# Patient Record
Sex: MALE | Race: WHITE | NOT HISPANIC OR LATINO | Employment: OTHER
[De-identification: names, ages, dates, MRNs, and addresses within clinical notes are randomized per-mention and may not be internally consistent; named-entity substitution may affect disease eponyms.]

---

## 2018-10-29 ENCOUNTER — RECORDS - HEALTHEAST (OUTPATIENT)
Dept: ADMINISTRATIVE | Facility: OTHER | Age: 63
End: 2018-10-29

## 2021-05-28 ENCOUNTER — RECORDS - HEALTHEAST (OUTPATIENT)
Dept: ADMINISTRATIVE | Facility: CLINIC | Age: 66
End: 2021-05-28

## 2021-06-01 ENCOUNTER — RECORDS - HEALTHEAST (OUTPATIENT)
Dept: ADMINISTRATIVE | Facility: CLINIC | Age: 66
End: 2021-06-01

## 2021-06-02 VITALS — WEIGHT: 180 LBS

## 2022-01-19 ENCOUNTER — APPOINTMENT (OUTPATIENT)
Dept: CT IMAGING | Facility: HOSPITAL | Age: 67
End: 2022-01-19
Attending: EMERGENCY MEDICINE
Payer: COMMERCIAL

## 2022-01-19 LAB
ALBUMIN SERPL-MCNC: 4.2 G/DL (ref 3.5–5)
ALBUMIN UR-MCNC: 10 MG/DL
ALP SERPL-CCNC: 89 U/L (ref 45–120)
ALT SERPL W P-5'-P-CCNC: 24 U/L (ref 0–45)
ANION GAP SERPL CALCULATED.3IONS-SCNC: 11 MMOL/L (ref 5–18)
APPEARANCE UR: CLEAR
AST SERPL W P-5'-P-CCNC: 22 U/L (ref 0–40)
BASOPHILS # BLD AUTO: 0 10E3/UL (ref 0–0.2)
BASOPHILS NFR BLD AUTO: 0 %
BILIRUB SERPL-MCNC: 0.3 MG/DL (ref 0–1)
BILIRUB UR QL STRIP: NEGATIVE
BUN SERPL-MCNC: 18 MG/DL (ref 8–22)
C REACTIVE PROTEIN LHE: 0.3 MG/DL (ref 0–0.8)
CALCIUM SERPL-MCNC: 9.6 MG/DL (ref 8.5–10.5)
CHLORIDE BLD-SCNC: 107 MMOL/L (ref 98–107)
CO2 SERPL-SCNC: 23 MMOL/L (ref 22–31)
COLOR UR AUTO: ABNORMAL
CREAT SERPL-MCNC: 1.43 MG/DL (ref 0.7–1.3)
EOSINOPHIL # BLD AUTO: 0.1 10E3/UL (ref 0–0.7)
EOSINOPHIL NFR BLD AUTO: 1 %
ERYTHROCYTE [DISTWIDTH] IN BLOOD BY AUTOMATED COUNT: 13.7 % (ref 10–15)
GFR SERPL CREATININE-BSD FRML MDRD: 54 ML/MIN/1.73M2
GLUCOSE BLD-MCNC: 138 MG/DL (ref 70–125)
GLUCOSE UR STRIP-MCNC: NEGATIVE MG/DL
HCT VFR BLD AUTO: 44.7 % (ref 40–53)
HGB BLD-MCNC: 14.8 G/DL (ref 13.3–17.7)
HGB UR QL STRIP: NEGATIVE
IMM GRANULOCYTES # BLD: 0 10E3/UL
IMM GRANULOCYTES NFR BLD: 0 %
KETONES UR STRIP-MCNC: NEGATIVE MG/DL
LEUKOCYTE ESTERASE UR QL STRIP: ABNORMAL
LYMPHOCYTES # BLD AUTO: 1.7 10E3/UL (ref 0.8–5.3)
LYMPHOCYTES NFR BLD AUTO: 15 %
MCH RBC QN AUTO: 29.1 PG (ref 26.5–33)
MCHC RBC AUTO-ENTMCNC: 33.1 G/DL (ref 31.5–36.5)
MCV RBC AUTO: 88 FL (ref 78–100)
MONOCYTES # BLD AUTO: 1 10E3/UL (ref 0–1.3)
MONOCYTES NFR BLD AUTO: 9 %
MUCOUS THREADS #/AREA URNS LPF: PRESENT /LPF
NEUTROPHILS # BLD AUTO: 8.4 10E3/UL (ref 1.6–8.3)
NEUTROPHILS NFR BLD AUTO: 75 %
NITRATE UR QL: NEGATIVE
NRBC # BLD AUTO: 0 10E3/UL
NRBC BLD AUTO-RTO: 0 /100
PH UR STRIP: 5.5 [PH] (ref 5–7)
PLATELET # BLD AUTO: 271 10E3/UL (ref 150–450)
POTASSIUM BLD-SCNC: 4.6 MMOL/L (ref 3.5–5)
PROT SERPL-MCNC: 7.6 G/DL (ref 6–8)
RBC # BLD AUTO: 5.09 10E6/UL (ref 4.4–5.9)
RBC URINE: 7 /HPF
SODIUM SERPL-SCNC: 141 MMOL/L (ref 136–145)
SP GR UR STRIP: 1.03 (ref 1–1.03)
UROBILINOGEN UR STRIP-MCNC: <2 MG/DL
WBC # BLD AUTO: 11.2 10E3/UL (ref 4–11)
WBC URINE: 8 /HPF

## 2022-01-19 PROCEDURE — 36415 COLL VENOUS BLD VENIPUNCTURE: CPT | Performed by: STUDENT IN AN ORGANIZED HEALTH CARE EDUCATION/TRAINING PROGRAM

## 2022-01-19 PROCEDURE — 250N000013 HC RX MED GY IP 250 OP 250 PS 637: Performed by: STUDENT IN AN ORGANIZED HEALTH CARE EDUCATION/TRAINING PROGRAM

## 2022-01-19 PROCEDURE — 258N000003 HC RX IP 258 OP 636: Performed by: EMERGENCY MEDICINE

## 2022-01-19 PROCEDURE — 80053 COMPREHEN METABOLIC PANEL: CPT | Performed by: EMERGENCY MEDICINE

## 2022-01-19 PROCEDURE — 250N000011 HC RX IP 250 OP 636: Performed by: STUDENT IN AN ORGANIZED HEALTH CARE EDUCATION/TRAINING PROGRAM

## 2022-01-19 PROCEDURE — 86140 C-REACTIVE PROTEIN: CPT | Performed by: EMERGENCY MEDICINE

## 2022-01-19 PROCEDURE — 81001 URINALYSIS AUTO W/SCOPE: CPT | Performed by: STUDENT IN AN ORGANIZED HEALTH CARE EDUCATION/TRAINING PROGRAM

## 2022-01-19 PROCEDURE — 250N000011 HC RX IP 250 OP 636

## 2022-01-19 PROCEDURE — C9803 HOPD COVID-19 SPEC COLLECT: HCPCS

## 2022-01-19 PROCEDURE — 74176 CT ABD & PELVIS W/O CONTRAST: CPT

## 2022-01-19 PROCEDURE — 99285 EMERGENCY DEPT VISIT HI MDM: CPT | Mod: 25

## 2022-01-19 PROCEDURE — 85025 COMPLETE CBC W/AUTO DIFF WBC: CPT | Performed by: EMERGENCY MEDICINE

## 2022-01-19 PROCEDURE — 96374 THER/PROPH/DIAG INJ IV PUSH: CPT

## 2022-01-19 PROCEDURE — 96361 HYDRATE IV INFUSION ADD-ON: CPT

## 2022-01-19 PROCEDURE — 81001 URINALYSIS AUTO W/SCOPE: CPT | Performed by: EMERGENCY MEDICINE

## 2022-01-19 RX ORDER — ACETAMINOPHEN 325 MG/1
975 TABLET ORAL ONCE
Status: COMPLETED | OUTPATIENT
Start: 2022-01-19 | End: 2022-01-19

## 2022-01-19 RX ORDER — KETOROLAC TROMETHAMINE 30 MG/ML
15 INJECTION, SOLUTION INTRAMUSCULAR; INTRAVENOUS ONCE
Status: COMPLETED | OUTPATIENT
Start: 2022-01-20 | End: 2022-01-19

## 2022-01-19 RX ORDER — ONDANSETRON 2 MG/ML
4 INJECTION INTRAMUSCULAR; INTRAVENOUS EVERY 30 MIN PRN
Status: DISCONTINUED | OUTPATIENT
Start: 2022-01-19 | End: 2022-01-20 | Stop reason: HOSPADM

## 2022-01-19 RX ORDER — KETOROLAC TROMETHAMINE 30 MG/ML
INJECTION, SOLUTION INTRAMUSCULAR; INTRAVENOUS
Status: COMPLETED
Start: 2022-01-19 | End: 2022-01-19

## 2022-01-19 RX ORDER — ONDANSETRON 4 MG/1
4 TABLET, ORALLY DISINTEGRATING ORAL ONCE
Status: COMPLETED | OUTPATIENT
Start: 2022-01-19 | End: 2022-01-19

## 2022-01-19 RX ADMIN — ACETAMINOPHEN 975 MG: 325 TABLET ORAL at 22:52

## 2022-01-19 RX ADMIN — KETOROLAC TROMETHAMINE 15 MG: 30 INJECTION, SOLUTION INTRAMUSCULAR; INTRAVENOUS at 23:39

## 2022-01-19 RX ADMIN — SODIUM CHLORIDE 1000 ML: 9 INJECTION, SOLUTION INTRAVENOUS at 23:40

## 2022-01-19 RX ADMIN — ONDANSETRON 4 MG: 4 TABLET, ORALLY DISINTEGRATING ORAL at 23:15

## 2022-01-19 RX ADMIN — Medication 50 MG: at 22:51

## 2022-01-20 ENCOUNTER — ANESTHESIA EVENT (OUTPATIENT)
Dept: SURGERY | Facility: AMBULATORY SURGERY CENTER | Age: 67
End: 2022-01-20
Payer: COMMERCIAL

## 2022-01-20 ENCOUNTER — VIRTUAL VISIT (OUTPATIENT)
Dept: UROLOGY | Facility: CLINIC | Age: 67
End: 2022-01-20
Payer: COMMERCIAL

## 2022-01-20 ENCOUNTER — HOSPITAL ENCOUNTER (EMERGENCY)
Facility: HOSPITAL | Age: 67
Discharge: HOME | End: 2022-01-20
Attending: EMERGENCY MEDICINE | Admitting: EMERGENCY MEDICINE
Payer: COMMERCIAL

## 2022-01-20 VITALS
DIASTOLIC BLOOD PRESSURE: 75 MMHG | OXYGEN SATURATION: 98 % | RESPIRATION RATE: 18 BRPM | TEMPERATURE: 98.3 F | SYSTOLIC BLOOD PRESSURE: 165 MMHG | WEIGHT: 185 LBS | HEART RATE: 54 BPM

## 2022-01-20 DIAGNOSIS — N20.0 CALCULUS OF KIDNEY: ICD-10-CM

## 2022-01-20 DIAGNOSIS — N13.2 HYDRONEPHROSIS WITH URINARY OBSTRUCTION DUE TO URETERAL CALCULUS: ICD-10-CM

## 2022-01-20 DIAGNOSIS — R11.0 NAUSEA: ICD-10-CM

## 2022-01-20 DIAGNOSIS — N20.1 CALCULUS OF URETER: Primary | ICD-10-CM

## 2022-01-20 DIAGNOSIS — N23 URETERAL COLIC: Primary | ICD-10-CM

## 2022-01-20 DIAGNOSIS — R10.9 ACUTE RIGHT FLANK PAIN: ICD-10-CM

## 2022-01-20 LAB
HOLD SPECIMEN: NORMAL
SARS-COV-2 RNA RESP QL NAA+PROBE: NEGATIVE

## 2022-01-20 PROCEDURE — 99203 OFFICE O/P NEW LOW 30 MIN: CPT | Mod: GT | Performed by: PHYSICIAN ASSISTANT

## 2022-01-20 PROCEDURE — 87635 SARS-COV-2 COVID-19 AMP PRB: CPT | Performed by: EMERGENCY MEDICINE

## 2022-01-20 RX ORDER — TAMSULOSIN HYDROCHLORIDE 0.4 MG/1
0.4 CAPSULE ORAL DAILY
Qty: 10 CAPSULE | Refills: 0 | Status: SHIPPED | OUTPATIENT
Start: 2022-01-20 | End: 2022-01-30

## 2022-01-20 RX ORDER — KETOROLAC TROMETHAMINE 30 MG/ML
15 INJECTION, SOLUTION INTRAMUSCULAR; INTRAVENOUS
Status: CANCELLED | OUTPATIENT
Start: 2022-01-20

## 2022-01-20 RX ORDER — MAGNESIUM SULFATE HEPTAHYDRATE 40 MG/ML
4 INJECTION, SOLUTION INTRAVENOUS ONCE
Status: CANCELLED | OUTPATIENT
Start: 2022-01-20 | End: 2022-01-20

## 2022-01-20 RX ORDER — ONDANSETRON 4 MG/1
4 TABLET, ORALLY DISINTEGRATING ORAL EVERY 8 HOURS PRN
Qty: 20 TABLET | Refills: 0 | Status: SHIPPED | OUTPATIENT
Start: 2022-01-20 | End: 2022-02-14

## 2022-01-20 RX ORDER — OXYCODONE HYDROCHLORIDE 5 MG/1
5 TABLET ORAL EVERY 6 HOURS PRN
Qty: 12 TABLET | Refills: 0 | Status: SHIPPED | OUTPATIENT
Start: 2022-01-20 | End: 2022-01-23

## 2022-01-20 RX ORDER — GABAPENTIN 100 MG/1
300 CAPSULE ORAL
Status: CANCELLED | OUTPATIENT
Start: 2022-01-20

## 2022-01-20 RX ORDER — ACETAMINOPHEN 500 MG
1000 TABLET ORAL
Status: CANCELLED | OUTPATIENT
Start: 2022-01-20

## 2022-01-20 ASSESSMENT — ENCOUNTER SYMPTOMS
DYSURIA: 0
HEMATURIA: 0
NAUSEA: 1
ABDOMINAL PAIN: 1
VOMITING: 1
FLANK PAIN: 1

## 2022-01-20 ASSESSMENT — MIFFLIN-ST. JEOR: SCORE: 1601.59

## 2022-01-20 ASSESSMENT — PAIN SCALES - GENERAL: PAINLEVEL: NO PAIN (0)

## 2022-01-20 NOTE — H&P (VIEW-ONLY)
Assessment/Plan:    Assessment & Plan   Tej was seen today for new patient.    Diagnoses and all orders for this visit:    Calculus of ureter  -     Case Request: CYSTOURETEROSCOPY, RETROGRADE PYELOGRAM, LASER LITHOTRIPSY WITH CALCULUS REMOVAL AND URETERAL STENT INSERTION; Standing  -     Case Request: CYSTOURETEROSCOPY, RETROGRADE PYELOGRAM, LASER LITHOTRIPSY WITH CALCULUS REMOVAL AND URETERAL STENT INSERTION  -     Patient Stated Goal: Know what to expect after surgery    Acute right flank pain    Hydronephrosis with urinary obstruction due to ureteral calculus  -     Case Request: CYSTOURETEROSCOPY, RETROGRADE PYELOGRAM, LASER LITHOTRIPSY WITH CALCULUS REMOVAL AND URETERAL STENT INSERTION; Standing  -     Case Request: CYSTOURETEROSCOPY, RETROGRADE PYELOGRAM, LASER LITHOTRIPSY WITH CALCULUS REMOVAL AND URETERAL STENT INSERTION    Nausea    Calculus of kidney    Other orders  -     Adult Urology Referral  -     sterile water (bottle) irrigation  -     XR Surgery JEROME Fluoro L/T 5 Min; Standing  -     XR Surgery JEROME Fluoro L/T 5 Min        Stone Management Plan  Stone Management 1/20/2022   Urinary Tract Infection No suspicion of infection   Renal Colic Well controlled symptoms   Renal Failure No suspicion of renal failure   Current CT date 1/20/2022   Right sided stones? Yes   R Number of ureteral stones 1   R GSD of ureteral stones 9   R Location of ureteral stone Proximal   R Number of kidney stones  7   R GSD of kidney stones 4 - 10   R Hydronephrosis Mild   R Stone Event New event   Diagnosis date 1/20/2022   Initial location of primary symptomatic stone Proximal   Initial GSD of primary symptomatic stone 9   R Current Plan Clear   Clear rationale Symptomatic   Left sided stones? Yes   L Number of ureteral stones No ureteral stones   L Number of kidney stones  4   L GSD of kidney stones 4 - 10   L Hydronephrosis None   L Stone Event No current event   L Current Plan Observe   Observe rationale Significant  stone burden amenable to emergency management         PLAN    65 yo M with history of kidney stones x 2, no prior surgical stone interventions. Recent ER visit for symptomatic, obstructing right proximal ureteral stone, pain currently controlled. Several, bilateral non-obstructing renal stones, right > left.    Will proceed with ureterscopic stone clearance at earliest opportunity; may cause disruption to his upcoming travel plans. Risks and benefits were detailed of ureteroscopic stone clearance including potential issues of urinary or systemic infection, ureteral injury, inaccessible stone, incomplete stone clearance, multiple surgeries, and stent related symptoms of urgency, frequency and hematuria. Patient verbalized understanding. Patient agrees with plan as discussed. Preoperative evaluation with primary care has been requested.    For symptom control, he was prescribed oxycodone, ondansetron and Flomax. Over the counter symptom control medications of ibuprofen, Dramamine and Tylenol were recommended.    Video call duration: 15 minutes  21 minutes spent on the date of the encounter doing chart review, history and exam, documentation and further activities per the note    Raquel Uribe PA-C  [facility]    Subjective:     HPI  Mr. Tej Sanders is a 66 year old  male who is being evaluated via a billable video visit by [facility] following [facility] ER visit for urolithiasis.    He is an unidentified composition stone former who has not required stone clearance procedures. He has not previously participated in stone risk evaluation. He has no identified modifiable stone risk factors. He has no identified non-modifiable stone risk factors.    He was sen in ER yesterday for acute onset right abdominal and right flank pain, starting a few days ago. The pain was initially mild but progressed to a severe and constant pain radiating into the the  right flank. He had associated nausea and vomiting. He reported history of kidney stones. Workup was notable for CT reporting an obstructing right ureteral stone and bilateral renal stones. Labs were negative for infection with mild renal injury. He was sent home with flomax, zofran, and oxycodone.    He is feeling ok at this time. Pertinent negative current symptoms include:  fever, chills, nausea, vomiting, hematuria, urinary frequency and dysuria. He is concerned about his upcoming travel, as he is flying to [place] next Tuesday through Friday.    CT scan from overnight is personally reviewed and demonstrates a mildly obstructing 9 mm right proximal ureteral stone. Multiple, bilateral renal stones.    Significant labs from presentation include no hematuria, mild pyuria, negative nitrite, no bacteria, normal WBC, normal C reactive protein, slightly elevated creatinine and normal potassium; covid negative    ROS   A 12 point comprehensive review of systems is negative except for HPI    Past Medical History:   Diagnosis Date     Kidney stone     x2 spontaneous passage     History reviewed. No pertinent surgical history.    Current Outpatient Medications   Medication Sig Dispense Refill     ondansetron (ZOFRAN ODT) 4 MG ODT tab Take 1 tablet (4 mg) by mouth every 8 hours as needed for nausea 20 tablet 0     oxyCODONE (ROXICODONE) 5 MG tablet Take 1 tablet (5 mg) by mouth every 6 hours as needed for pain 12 tablet 0     tamsulosin (FLOMAX) 0.4 MG capsule Take 1 capsule (0.4 mg) by mouth daily for 10 doses 10 capsule 0       No Known Allergies    Social History     Socioeconomic History     Marital status:      Spouse name: Not on file     Number of children: Not on file     Years of education: Not on file     Highest education level: Not on file   Occupational History     Not on file   Tobacco Use     Smoking status: Never Smoker     Smokeless tobacco: Never Used   Substance and Sexual Activity     Alcohol  use: Not Currently     Drug use: Never     Sexual activity: Not on file   Other Topics Concern     Parent/sibling w/ CABG, MI or angioplasty before 65F 55M? Not Asked   Social History Narrative     Not on file     Social Determinants of Health     Financial Resource Strain: Not on file   Food Insecurity: Not on file   Transportation Needs: Not on file   Physical Activity: Not on file   Stress: Not on file   Social Connections: Not on file   Intimate Partner Violence: Not on file   Housing Stability: Not on file       History reviewed. No pertinent family history.    Objective:     No vitals or physical exam obtained due to virtual visit    LABS  Most Recent 3 CBC's:  Recent Labs   Lab Test 01/19/22  2314   WBC 11.2*   HGB 14.8   MCV 88        Most Recent 3 BMP's:  Recent Labs   Lab Test 01/19/22 2314      POTASSIUM 4.6   CHLORIDE 107   CO2 23   BUN 18   CR 1.43*   ANIONGAP 11   CORI 9.6   *     Most Recent Urinalysis:  Recent Labs   Lab Test 01/19/22  2308   COLOR Light Yellow   APPEARANCE Clear   URINEGLC Negative   URINEBILI Negative   URINEKETONE Negative   SG 1.027   UBLD Negative   URINEPH 5.5   PROTEIN 10 *   NITRITE Negative   LEUKEST 25 Cecilia/uL*   RBCU 7*   WBCU 8*     Most Recent ESR & CRP:  Recent Labs   Lab Test 01/19/22 2314   CRP 0.3

## 2022-01-20 NOTE — DISCHARGE INSTRUCTIONS
For pain control at home, take:  - over-the-counter ibuprofen 800mg by mouth every 8 hours scheduled (max dose 2400mg in 24 hours)  - over-the-counter acetaminophen 1g by mouth every 6 hours scheduled (max dose 4g in 24 hours)  - prescribed oxycodone for breakthrough pain    Use the prescribed Zofran as needed for any nausea.    Take the Flomax as prescribed. This can help with passing the stone on your own.    Call kidney stone clinic in the morning to arrange follow up with them.    Return to the Emergency Department for any severe worsening, persistent vomiting, or any other concerns.

## 2022-01-20 NOTE — PROGRESS NOTES
Patient is roomed via telephone for a virtual visit.  Patient confirmed he is in the [facility] at the time of this appointment.  Patient understands that this virtual visit is billable and agree to proceed with appointment.

## 2022-01-20 NOTE — ED TRIAGE NOTES
Patient has a sudden onset of right mid abdominal pain that radiates to flank. Also endorses nausea. Had a similar episode on Sunday that resolved after four hours. Reports a history of kidney stones. Denies hematuria.

## 2022-01-20 NOTE — PATIENT INSTRUCTIONS
Patient Stated Goal: Know what to expect after surgery  Ureteroscopy    Ureteroscopy is a procedure which is done for clearance of stones from the ureter, kidney or both. There are no incisions involved. The procedure involves your surgeon placing a small scope into your urethra. This is the opening where urine leaves your body.  The surgeon watches as they carefully guide the scope to the stone(s).  Modern flexible ureteroscopes can be used to reach virtually any location within the urinary tract.     The size, shape and location of the stone determines how best to treat the stone(s).  Whenever possible, stones are removed in one piece.  Larger stones need to be broken using a laser before removing in smaller pieces.  The goal is to remove all stones and stone fragments from that side of the body in a single treatment.  Complete stone clearance is an important step to prevent future kidney stone episodes.    Surgery:    Same day outpatient procedure    30-60 minutes    Procedure done in hospital surgical suite    General anesthesia (you will be asleep during the procedure)     Antibiotic prior to surgery to prevent infection    Physician will visit with you and respond to any questions or concerns and consent will be signed prior to going to the operating room    Risks:    Infection - Preoperative antibiotics should prevent new infections but it is possible that unanticipated bacteria may be introduced at time of surgery or that the stones were actually chronically infected before surgery      Injury - The ureter may be injured during this procedure.  This is most likely to happen if the ureter was very inflamed before surgery or if a stone is very tightly impacted.  The surgeon will not aggressively treat a stone if this creates a risk of injury.        Inaccessible Stones -A single procedure is effective in 95% of cases, but if your ureter is very narrow or your kidney stone is very impacted, a stent will be  placed and the procedure stopped.  In 1-2 weeks after the ureter has relaxed, the patient will be brought back to surgery and the procedure can be safely performed.      Incomplete stone clearance -Occasionally stone or stone fragments may not be completely cleared.  These may pass on their own, which may cause discomfort.  Our goal is to remove all possible stones and fragments.    Stent:      An internal soft tube will be placed between the kidney and the bladder while in surgery (after the stone is cleared). The stent will keep the kidney draining.    What should I expect?     It is common for a stent to cause some irritation and discomfort.   You may have:      The need to urinate suddenly     The need to urinate often     Pain during urination     A dull backache, which may get worse during urination     Blood stained urine (like fruit punch) and occasional small clots    It s important to remember the stent is necessary and only temporary. To feel more comfortable:      Drink more than you normally would but you do not have to constantly  flush your kidneys     Limiting your activity may decrease irritation or bleeding    Ibuprofen - 2 tablets every 6-8 hours     Use pain medications as directed.    When is the stent removed?    Most stents are removed within 5 days to 2 weeks after a procedure.     How is the stent removed?     Your stent will be removed in the [facility] with a small telescope and a grasping tool.  It usually takes less than 1 minute to remove the stent.    What should I expect after the stent is removed?     You should feel normal by the next day    Some patients find:    An increase in back pain about an hour after the stent is removed as the kidney fills up with urine before it starts to empty.  It can be as uncomfortable as your initial stone episode.  Taking pain medications before stent removal may be helpful, but you would need someone else to drive you to and from your  appointment.    Bladder symptoms usually disappear by the next morning.    Small amounts of blood in the urine may be seen occasionally for up to a week.    Diet:      After surgery, there are no dietary restrictions - Drink to thirst, there is no need to increase intake of fluids, as this may increase nausea symptoms. Try to eat smaller, more frequent snacks, instead of large meals.    Activity:    Many people return to work within 1-2 days. Fatigue is normal for a couple of weeks following surgery. With increased activity you may experience more discomfort and you may notice more blood in your urine.      Post-Operative Symptom Control    While you recover from your procedure, you can take steps to ease your recovery.    Medications that prevent further episodes of severe pain and help stones pass: Take these as prescribed on a regular basis even if you are NOT in pain      Ibuprofen (Advil or Motrin) - Is available over the counter Take 2 (200mg) tablets every 6 hours until the stone passes.  o prevents spasm of the ureter.    o Decreases pain      Dramamine - (drowsy version, non-generic formulation) Is available over the counter and decreases spasm of the ureter.  Take 50mg at bedtime every night until the stone passes. In addition, take every 6 hours as needed.  Dramamine:  o Decreases nausea  o Decreases acute pain  o Decreases recurrence of pain for next 24 hours  o Will help you sleep        *This medication will cause increased drowsiness, do not drive or operate machinery for 6 hours      Flomax- Studies show that Flomax decreases irritation from stents.   o Take every day with food until stone passes even if you do not have pain  o Flomax does not relieve pain.        *This medication may cause nasal congestion or light-headedness      Detrol ( Tolterodine) - After surgery Detrol may decrease stent irritation and pelvic pain  o Take as prescribed     *This medication may cause dry mouth, constipation or  blurry vision. Stop medication if unable to urinate.    Medication that are taken as needed to manage break through symptoms: Take these ONLY as required and hopefully not at all      Narcotics (Percocet, Vicodin, Dilaudid)- take as prescribed for severe pain unrelieved by ibuprofen and dramamine  o Take as prescribed for severe pain  o Narcotics have significant side effects and only  cover-up  pain. They have no effect on cause of pain.  o Common side effects:  - Confusion, disorientation and sedation - DO NOT DRIVE OR OPERATE MACHINERY WITHIN 24 HOURS  - Nausea - take Dramamine or Zofran  or Haldol to help control  - Constipation  - Sleep disturbances      Ondansetron (Zofran)-  o Take as prescribed  o Reserve for severe nausea  o May cause constipation, start over the counter Miralax if needed to treat this    Haldol-  o Take as prescribed  o Reserve for severe nausea    Warning Signs/Symptoms - Please call the [facility] 24 hours a day at [phone] IMMEDIATELY if you experience any of these:    Fever greater than 100.1     Chills    Pain NOT CONTROLLED by pain medications    Heavy bleeding or large clots in urine (small clots can be normal)    Persistent nausea and/or vomiting    Post-Operative Follow up:    The stone(s) will be sent from surgery to a lab for composition analysis.  These results are usually available before a one month post-operative visit.  If you had laser treatment to break up your stone, you will usually be scheduled for a low dose CT scan prior to your one month appointment.  This scan allows your surgeon to confirm that all stone fragments were cleared at time of surgery and that there have been no complications.  These results along with possible labs and urine studies will help us develop an individualized plan to prevent new stones from forming and keep existing stones from enlarging.  This visit is usually scheduled about 1 month after your original surgery.    The  [facility] can respond to your questions or concerns 24 hours a day at [phone].

## 2022-01-20 NOTE — ED PROVIDER NOTES
EMERGENCY DEPARTMENT ENCOUNTER      NAME: Tej Sanders  AGE: 66 year old male  YOB: 1955  MRN: [id number]  EVALUATION DATE & TIME: No admission date for patient encounter.    PCP: Comfort Willard    ED PROVIDER: Indra Cid M.D.      Chief Complaint   Patient presents with     Flank Pain         IMPRESSION  1. Ureteral colic        PLAN  - routine home kidney stone cares (scheduled NSAIDs, PRN oxycodone, PRN Zofran, Flomax, urine strainer)  - close PCP & [facility] follow up  - discharge to home    ED COURSE & MEDICAL DECISION MAKING    ED Course as of 01/20/22 0158   Thu Jan 20, 2022   0131 66yoM with history of kidney stones presenting with right flank pain; initially on 1/16 and then today (1/19) in the evening with nausea. No urinary symptoms, fevers, sweats, chills. Pain resolved with Toradol in ED waiting room tonight. Workup from waiting room already obtained at this time. Has [id number] right ureteral stone to proximal right ureter with no other acute abnormality or explanatory pathology on CT: UA with no UTI; blood work with negative CRP, creatinine 1.4 with no glaring electrolyte abnormality, WBC 11, no anemia. Patient calm on exam with no ongoing complaints, benign abdomen, no CVA tenderness, clear lungs with normal work of breathing, normal neuro exam. No concern for aortic dissection. Has ureteral colic; unlikely to pass stone on his own given size. No indication for emergent removal tonight though. Patient asymptomatic currently and agreeable with outpatient follow up; [facility] referral made and contact information given to patient as well. Given urine strainer prior to discharge. Screening COVID-19 swab obtained as well in case of needing intervention with [facility]. Patient comfortable with discharge at this time. Return precautions and need for PCP & [facility] follow up discussed and understood. No further questions at the time of discharge.       1:14 AM I met with the patient for the initial interview  and physical examination. Discussed plan for treatment and workup in the ED.  1:25 AM I updated the patient about lab and imaging results. We discussed the plan for discharge and the patient is agreeable. Reviewed supportive cares, symptomatic treatment, outpatient follow up, and reasons to return to the Emergency Department. Patient to be discharged by ED RN.        This patient involved a high degree of complexity in medical decision making, as significant risks were present and assessed.    I wore the following PPE during this patient encounter:  N95 mask, face shield w/ eye protection, gloves    MEDICATIONS GIVEN IN THE EMERGENCY DEPARTMENT  Medications   ondansetron (ZOFRAN) injection 4 mg (4 mg Intravenous Not Given 1/20/22 0152)   ondansetron (ZOFRAN-ODT) ODT tab 4 mg (4 mg Oral Given 1/19/22 2315)   dimenhyDRINATE chew tab 50 mg (50 mg Oral Given 1/19/22 2251)   acetaminophen (TYLENOL) tablet 975 mg (975 mg Oral Given 1/19/22 2252)   ketorolac (TORADOL) injection 15 mg (15 mg Intravenous Given 1/19/22 2339)   0.9% sodium chloride BOLUS (0 mLs Intravenous Stopped 1/20/22 0150)       NEW PRESCRIPTIONS STARTED AT TODAY'S ER VISIT  Current Discharge Medication List      START taking these medications    Details   ondansetron (ZOFRAN ODT) 4 MG ODT tab Take 1 tablet (4 mg) by mouth every 8 hours as needed for nausea  Qty: 20 tablet, Refills: 0      oxyCODONE (ROXICODONE) 5 MG tablet Take 1 tablet (5 mg) by mouth every 6 hours as needed for pain  Qty: 12 tablet, Refills: 0      tamsulosin (FLOMAX) 0.4 MG capsule Take 1 capsule (0.4 mg) by mouth daily for 10 doses  Qty: 10 capsule, Refills: 0                 =================================================================      HPI  Patient information was obtained from: Patient     Use of : N/A         Tej Sanders is a 66 year old male with a pertinent history of kidney stones who presents to this ED by walk in  for evaluation of right-sided abdominal  pain and flank pain. Patient reports that in the late afternoon on Sunday (~3.5 days ago) he suddenly developed right-sided abdominal pain. He has a history of kidney stones and he notes that his pain on Sunday was not as severe as with prior kidney stones and it did not radiate to his back. He also had some nausea with vomiting, but the pain eventually subsided after 5-6 hours. Earlier this past evening around 7 PM (~6 hours ago) he again had sudden onset of right-sided abdominal pain now radiating to his right flank region with associated nausea and vomiting. He states that his pain resolved after Toradol here in the ED. No dysuria, hematuria, or additional symptoms or complaints at this time. Patient states that in the past he was able to pass his kidney stones on his own without interventions.      REVIEW OF SYSTEMS   Review of Systems   Gastrointestinal: Positive for abdominal pain, nausea and vomiting.   Genitourinary: Positive for flank pain. Negative for dysuria and hematuria.   All other systems reviewed and are negative.      --------------- MEDICAL HISTORY ---------------  PAST MEDICAL HISTORY:  History reviewed. No pertinent past medical history.  Patient Active Problem List   Diagnosis     Migraine     Kidney stones       PAST SURGICAL HISTORY:  History reviewed. No pertinent surgical history.    CURRENT MEDICATIONS:      Current Facility-Administered Medications:      ondansetron (ZOFRAN) injection 4 mg, 4 mg, Intravenous, Q30 Min PRN, Calos Corral MD    Current Outpatient Medications:      ondansetron (ZOFRAN ODT) 4 MG ODT tab, Take 1 tablet (4 mg) by mouth every 8 hours as needed for nausea, Disp: 20 tablet, Rfl: 0     oxyCODONE (ROXICODONE) 5 MG tablet, Take 1 tablet (5 mg) by mouth every 6 hours as needed for pain, Disp: 12 tablet, Rfl: 0     tamsulosin (FLOMAX) 0.4 MG capsule, Take 1 capsule (0.4 mg) by mouth daily for 10 doses, Disp: 10 capsule, Rfl: 0    ALLERGIES:  No Known  Allergies    FAMILY HISTORY:  History reviewed. No pertinent family history.    SOCIAL HISTORY:   Social History     Socioeconomic History     Marital status:      Spouse name: Not on file     Number of children: Not on file     Years of education: Not on file     Highest education level: Not on file   Occupational History     Not on file   Tobacco Use     Smoking status: Not on file     Smokeless tobacco: Not on file   Substance and Sexual Activity     Alcohol use: Not on file     Drug use: Not on file     Sexual activity: Not on file   Other Topics Concern     Not on file   Social History Narrative     Not on file     Social Determinants of Health     Financial Resource Strain: Not on file   Food Insecurity: Not on file   Transportation Needs: Not on file   Physical Activity: Not on file   Stress: Not on file   Social Connections: Not on file   Intimate Partner Violence: Not on file   Housing Stability: Not on file         --------------- PHYSICAL EXAM ---------------  Nursing notes and vitals reviewed by me.  VITALS:  Vitals:    01/19/22 2246   BP: (!) 193/94   Pulse: 59   Resp: 18   Temp: 99.1  F (37.3  C)   TempSrc: Temporal   SpO2: 99%   Weight: 83.9 kg (185 lb)       PHYSICAL EXAM:    General:  alert, interactive, no distress  Eyes:  conjunctivae clear, conjugate gaze   HENT:  atraumatic, nose with no rhinorrhea, oropharynx clear  Neck:  no meningismus  Cardiovascular:  HR 80s during exam, regular rhythm, no murmurs, brisk cap refill  Chest:  no chest wall tenderness  Pulmonary:  no stridor, normal phonation, normal work of breathing, clear lungs bilaterally  Abdomen:  soft, nondistended, nontender  :  no CVA tenderness  Back:  no midline spinal tenderness  Musculoskeletal:  no pretibial edema, no calf tenderness. Gross ROM intact to joints of extremities with no obvious deformities.  Skin:  warm, dry, no rash  Neuro:  awake, alert, answers questions appropriately, follows commands, moves all  limbs  Psych:  calm, normal affect      --------------- RESULTS ---------------  LAB:  Reviewed and interpreted by me.  Results for orders placed or performed during the hospital encounter of 01/19/22   CT Abdomen Pelvis w/o Contrast    Impression    IMPRESSION:   1.  4 x 5 x 9 mm obstructing stone proximal right ureter resulting in mild hydronephrosis.  2.  Multiple additional stones both kidneys.  3.  Fatty liver.  4.  Left colonic diverticulosis.     UA with Microscopic reflex to Culture    Specimen: Urine, Clean Catch   Result Value Ref Range    Color Urine Light Yellow Colorless, Straw, Light Yellow, Yellow    Appearance Urine Clear Clear    Glucose Urine Negative Negative mg/dL    Bilirubin Urine Negative Negative    Ketones Urine Negative Negative mg/dL    Specific Gravity Urine 1.027 1.001 - 1.030    Blood Urine Negative Negative    pH Urine 5.5 5.0 - 7.0    Protein Albumin Urine 10  (A) Negative mg/dL    Urobilinogen Urine <2.0 <2.0 mg/dL    Nitrite Urine Negative Negative    Leukocyte Esterase Urine 25 Cecilia/uL (A) Negative    Mucus Urine Present (A) None Seen /LPF    RBC Urine 7 (H) <=2 /HPF    WBC Urine 8 (H) <=5 /HPF   Extra Red Top Tube   Result Value Ref Range    Hold Specimen JIC    Extra Green Top (Lithium Heparin) Tube   Result Value Ref Range    Hold Specimen JIC    Extra Purple Top Tube   Result Value Ref Range    Hold Specimen JIC    Comprehensive metabolic panel   Result Value Ref Range    Sodium 141 136 - 145 mmol/L    Potassium 4.6 3.5 - 5.0 mmol/L    Chloride 107 98 - 107 mmol/L    Carbon Dioxide (CO2) 23 22 - 31 mmol/L    Anion Gap 11 5 - 18 mmol/L    Urea Nitrogen 18 8 - 22 mg/dL    Creatinine 1.43 (H) 0.70 - 1.30 mg/dL    Calcium 9.6 8.5 - 10.5 mg/dL    Glucose 138 (H) 70 - 125 mg/dL    Alkaline Phosphatase 89 45 - 120 U/L    AST 22 0 - 40 U/L    ALT 24 0 - 45 U/L    Protein Total 7.6 6.0 - 8.0 g/dL    Albumin 4.2 3.5 - 5.0 g/dL    Bilirubin Total 0.3 0.0 - 1.0 mg/dL    GFR Estimate 54 (L)  >60 mL/min/1.73m2   CRP inflammation   Result Value Ref Range    CRP 0.3 0.0-<0.8 mg/dL   CBC with platelets and differential   Result Value Ref Range    WBC Count 11.2 (H) 4.0 - 11.0 10e3/uL    RBC Count 5.09 4.40 - 5.90 10e6/uL    Hemoglobin 14.8 13.3 - 17.7 g/dL    Hematocrit 44.7 40.0 - 53.0 %    MCV 88 78 - 100 fL    MCH 29.1 26.5 - 33.0 pg    MCHC 33.1 31.5 - 36.5 g/dL    RDW 13.7 10.0 - 15.0 %    Platelet Count 271 150 - 450 10e3/uL    % Neutrophils 75 %    % Lymphocytes 15 %    % Monocytes 9 %    % Eosinophils 1 %    % Basophils 0 %    % Immature Granulocytes 0 %    NRBCs per 100 WBC 0 <1 /100    Absolute Neutrophils 8.4 (H) 1.6 - 8.3 10e3/uL    Absolute Lymphocytes 1.7 0.8 - 5.3 10e3/uL    Absolute Monocytes 1.0 0.0 - 1.3 10e3/uL    Absolute Eosinophils 0.1 0.0 - 0.7 10e3/uL    Absolute Basophils 0.0 0.0 - 0.2 10e3/uL    Absolute Immature Granulocytes 0.0 <=0.4 10e3/uL    Absolute NRBCs 0.0 10e3/uL       RADIOLOGY:  Reviewed by me. Please see official radiology report.  Recent Results (from the past 24 hour(s))   CT Abdomen Pelvis w/o Contrast    Narrative    EXAM: CT ABDOMEN PELVIS W/O CONTRAST  LOCATION: [facility]  DATE/TIME: 1/20/2022 12:04 AM    INDICATION: Flank pain, kidney stone suspected  COMPARISON: CT abdomen pelvis 01/13/2011  TECHNIQUE: CT scan of the abdomen and pelvis was performed without IV contrast. Multiplanar reformats were obtained. Dose reduction techniques were used.  CONTRAST: None.    FINDINGS:   LOWER CHEST: Normal.    HEPATOBILIARY: Fatty infiltration of the liver.    PANCREAS: Normal.    SPLEEN: Normal.    ADRENAL GLANDS: Normal.    KIDNEYS/BLADDER: 4 x 5 x 9 mm obstructing stone proximal right ureter resulting in mild hydronephrosis. Mild perinephric stranding. There are at least 6 additional stones right kidney the largest measuring 4 mm. 3 nonobstructing stones upper pole left   kidney, the largest measuring 5 mm. No bladder stones.    BOWEL:  Diverticulosis descending and sigmoid colon, without evidence for diverticulitis or bowel obstruction. Normal appendix.    LYMPH NODES: Normal.    VASCULATURE: Atherosclerotic disease abdominal aorta and iliac arteries.    PELVIC ORGANS: Normal.    MUSCULOSKELETAL: Hypertrophic change thoracolumbar spine.      Impression    IMPRESSION:   1.  4 x 5 x 9 mm obstructing stone proximal right ureter resulting in mild hydronephrosis.  2.  Multiple additional stones both kidneys.  3.  Fatty liver.  4.  Left colonic diverticulosis.             I, Brenda Morgan, am serving as a scribe to document services personally performed by Dr. Indra Cid based on my observation and the provider's statements to me. I, Indra Cid MD attest that Brenda Morgan is acting in a scribe capacity, has observed my performance of the services and has documented them in accordance with my direction.      Indra Cid MD  01/20/22  Emergency Medicine  [facility] EMERGENCY DEPARTMENT  [address] [place] [id number] [phone]  Dept: [phone]     Indra Cid MD  01/20/22 0158

## 2022-01-20 NOTE — PROGRESS NOTES
Assessment/Plan:    Assessment & Plan   Tej was seen today for new patient.    Diagnoses and all orders for this visit:    Calculus of ureter  -     Case Request: CYSTOURETEROSCOPY, RETROGRADE PYELOGRAM, LASER LITHOTRIPSY WITH CALCULUS REMOVAL AND URETERAL STENT INSERTION; Standing  -     Case Request: CYSTOURETEROSCOPY, RETROGRADE PYELOGRAM, LASER LITHOTRIPSY WITH CALCULUS REMOVAL AND URETERAL STENT INSERTION  -     Patient Stated Goal: Know what to expect after surgery    Acute right flank pain    Hydronephrosis with urinary obstruction due to ureteral calculus  -     Case Request: CYSTOURETEROSCOPY, RETROGRADE PYELOGRAM, LASER LITHOTRIPSY WITH CALCULUS REMOVAL AND URETERAL STENT INSERTION; Standing  -     Case Request: CYSTOURETEROSCOPY, RETROGRADE PYELOGRAM, LASER LITHOTRIPSY WITH CALCULUS REMOVAL AND URETERAL STENT INSERTION    Nausea    Calculus of kidney    Other orders  -     Adult Urology Referral  -     sterile water (bottle) irrigation  -     XR Surgery JEROME Fluoro L/T 5 Min; Standing  -     XR Surgery JEROME Fluoro L/T 5 Min        Stone Management Plan  Stone Management 1/20/2022   Urinary Tract Infection No suspicion of infection   Renal Colic Well controlled symptoms   Renal Failure No suspicion of renal failure   Current CT date 1/20/2022   Right sided stones? Yes   R Number of ureteral stones 1   R GSD of ureteral stones 9   R Location of ureteral stone Proximal   R Number of kidney stones  7   R GSD of kidney stones 4 - 10   R Hydronephrosis Mild   R Stone Event New event   Diagnosis date 1/20/2022   Initial location of primary symptomatic stone Proximal   Initial GSD of primary symptomatic stone 9   R Current Plan Clear   Clear rationale Symptomatic   Left sided stones? Yes   L Number of ureteral stones No ureteral stones   L Number of kidney stones  4   L GSD of kidney stones 4 - 10   L Hydronephrosis None   L Stone Event No current event   L Current Plan Observe   Observe rationale Significant  stone burden amenable to emergency management         PLAN    65 yo M with history of kidney stones x 2, no prior surgical stone interventions. Recent ER visit for symptomatic, obstructing right proximal ureteral stone, pain currently controlled. Several, bilateral non-obstructing renal stones, right > left.    Will proceed with ureterscopic stone clearance at earliest opportunity; may cause disruption to his upcoming travel plans. Risks and benefits were detailed of ureteroscopic stone clearance including potential issues of urinary or systemic infection, ureteral injury, inaccessible stone, incomplete stone clearance, multiple surgeries, and stent related symptoms of urgency, frequency and hematuria. Patient verbalized understanding. Patient agrees with plan as discussed. Preoperative evaluation with primary care has been requested.    For symptom control, he was prescribed oxycodone, ondansetron and Flomax. Over the counter symptom control medications of ibuprofen, Dramamine and Tylenol were recommended.    Video call duration: 15 minutes  21 minutes spent on the date of the encounter doing chart review, history and exam, documentation and further activities per the note    Raquel Uribe PA-C  [facility]    Subjective:     HPI  Mr. Tej Sanders is a 66 year old  male who is being evaluated via a billable video visit by [facility] following [facility] ER visit for urolithiasis.    He is an unidentified composition stone former who has not required stone clearance procedures. He has not previously participated in stone risk evaluation. He has no identified modifiable stone risk factors. He has no identified non-modifiable stone risk factors.    He was sen in ER yesterday for acute onset right abdominal and right flank pain, starting a few days ago. The pain was initially mild but progressed to a severe and constant pain radiating into the the  right flank. He had associated nausea and vomiting. He reported history of kidney stones. Workup was notable for CT reporting an obstructing right ureteral stone and bilateral renal stones. Labs were negative for infection with mild renal injury. He was sent home with flomax, zofran, and oxycodone.    He is feeling ok at this time. Pertinent negative current symptoms include:  fever, chills, nausea, vomiting, hematuria, urinary frequency and dysuria. He is concerned about his upcoming travel, as he is flying to [place] next Tuesday through Friday.    CT scan from overnight is personally reviewed and demonstrates a mildly obstructing 9 mm right proximal ureteral stone. Multiple, bilateral renal stones.    Significant labs from presentation include no hematuria, mild pyuria, negative nitrite, no bacteria, normal WBC, normal C reactive protein, slightly elevated creatinine and normal potassium; covid negative    ROS   A 12 point comprehensive review of systems is negative except for HPI    Past Medical History:   Diagnosis Date     Kidney stone     x2 spontaneous passage     History reviewed. No pertinent surgical history.    Current Outpatient Medications   Medication Sig Dispense Refill     ondansetron (ZOFRAN ODT) 4 MG ODT tab Take 1 tablet (4 mg) by mouth every 8 hours as needed for nausea 20 tablet 0     oxyCODONE (ROXICODONE) 5 MG tablet Take 1 tablet (5 mg) by mouth every 6 hours as needed for pain 12 tablet 0     tamsulosin (FLOMAX) 0.4 MG capsule Take 1 capsule (0.4 mg) by mouth daily for 10 doses 10 capsule 0       No Known Allergies    Social History     Socioeconomic History     Marital status:      Spouse name: Not on file     Number of children: Not on file     Years of education: Not on file     Highest education level: Not on file   Occupational History     Not on file   Tobacco Use     Smoking status: Never Smoker     Smokeless tobacco: Never Used   Substance and Sexual Activity     Alcohol  use: Not Currently     Drug use: Never     Sexual activity: Not on file   Other Topics Concern     Parent/sibling w/ CABG, MI or angioplasty before 65F 55M? Not Asked   Social History Narrative     Not on file     Social Determinants of Health     Financial Resource Strain: Not on file   Food Insecurity: Not on file   Transportation Needs: Not on file   Physical Activity: Not on file   Stress: Not on file   Social Connections: Not on file   Intimate Partner Violence: Not on file   Housing Stability: Not on file       History reviewed. No pertinent family history.    Objective:     No vitals or physical exam obtained due to virtual visit    LABS  Most Recent 3 CBC's:  Recent Labs   Lab Test 01/19/22  2314   WBC 11.2*   HGB 14.8   MCV 88        Most Recent 3 BMP's:  Recent Labs   Lab Test 01/19/22 2314      POTASSIUM 4.6   CHLORIDE 107   CO2 23   BUN 18   CR 1.43*   ANIONGAP 11   CORI 9.6   *     Most Recent Urinalysis:  Recent Labs   Lab Test 01/19/22  2308   COLOR Light Yellow   APPEARANCE Clear   URINEGLC Negative   URINEBILI Negative   URINEKETONE Negative   SG 1.027   UBLD Negative   URINEPH 5.5   PROTEIN 10 *   NITRITE Negative   LEUKEST 25 Cecilia/uL*   RBCU 7*   WBCU 8*     Most Recent ESR & CRP:  Recent Labs   Lab Test 01/19/22 2314   CRP 0.3

## 2022-01-21 ENCOUNTER — ANESTHESIA (OUTPATIENT)
Dept: SURGERY | Facility: AMBULATORY SURGERY CENTER | Age: 67
End: 2022-01-21
Payer: COMMERCIAL

## 2022-01-21 ENCOUNTER — HOSPITAL ENCOUNTER (OUTPATIENT)
Facility: AMBULATORY SURGERY CENTER | Age: 67
End: 2022-01-21
Attending: UROLOGY
Payer: COMMERCIAL

## 2022-01-21 VITALS
OXYGEN SATURATION: 94 % | DIASTOLIC BLOOD PRESSURE: 56 MMHG | TEMPERATURE: 97.5 F | HEIGHT: 69 IN | RESPIRATION RATE: 16 BRPM | SYSTOLIC BLOOD PRESSURE: 104 MMHG | BODY MASS INDEX: 27.4 KG/M2 | WEIGHT: 185 LBS | HEART RATE: 66 BPM

## 2022-01-21 DIAGNOSIS — N13.2 HYDRONEPHROSIS WITH URINARY OBSTRUCTION DUE TO URETERAL CALCULUS: ICD-10-CM

## 2022-01-21 DIAGNOSIS — N20.1 CALCULUS OF URETER: ICD-10-CM

## 2022-01-21 PROCEDURE — 82365 CALCULUS SPECTROSCOPY: CPT | Performed by: UROLOGY

## 2022-01-21 PROCEDURE — 99000 SPECIMEN HANDLING OFFICE-LAB: CPT | Performed by: UROLOGY

## 2022-01-21 PROCEDURE — 52356 CYSTO/URETERO W/LITHOTRIPSY: CPT | Mod: RT | Performed by: UROLOGY

## 2022-01-21 DEVICE — URETERAL STENT
Type: IMPLANTABLE DEVICE | Site: URETER | Status: FUNCTIONAL
Brand: PERCUFLEX™ PLUS

## 2022-01-21 RX ORDER — SODIUM CHLORIDE, SODIUM LACTATE, POTASSIUM CHLORIDE, CALCIUM CHLORIDE 600; 310; 30; 20 MG/100ML; MG/100ML; MG/100ML; MG/100ML
INJECTION, SOLUTION INTRAVENOUS CONTINUOUS
Status: DISCONTINUED | OUTPATIENT
Start: 2022-01-21 | End: 2022-01-22 | Stop reason: HOSPADM

## 2022-01-21 RX ORDER — FENTANYL CITRATE 50 UG/ML
25 INJECTION, SOLUTION INTRAMUSCULAR; INTRAVENOUS EVERY 5 MIN PRN
Status: DISCONTINUED | OUTPATIENT
Start: 2022-01-21 | End: 2022-01-22 | Stop reason: HOSPADM

## 2022-01-21 RX ORDER — MEPERIDINE HYDROCHLORIDE 25 MG/ML
12.5 INJECTION INTRAMUSCULAR; INTRAVENOUS; SUBCUTANEOUS
Status: DISCONTINUED | OUTPATIENT
Start: 2022-01-21 | End: 2022-01-22 | Stop reason: HOSPADM

## 2022-01-21 RX ORDER — PROPOFOL 10 MG/ML
INJECTION, EMULSION INTRAVENOUS CONTINUOUS PRN
Status: DISCONTINUED | OUTPATIENT
Start: 2022-01-21 | End: 2022-01-21

## 2022-01-21 RX ORDER — FENTANYL CITRATE 50 UG/ML
25 INJECTION, SOLUTION INTRAMUSCULAR; INTRAVENOUS
Status: DISCONTINUED | OUTPATIENT
Start: 2022-01-21 | End: 2022-01-22 | Stop reason: HOSPADM

## 2022-01-21 RX ORDER — LIDOCAINE 40 MG/G
CREAM TOPICAL
Status: DISCONTINUED | OUTPATIENT
Start: 2022-01-21 | End: 2022-01-22 | Stop reason: HOSPADM

## 2022-01-21 RX ORDER — LIDOCAINE HYDROCHLORIDE 20 MG/ML
INJECTION, SOLUTION INFILTRATION; PERINEURAL PRN
Status: DISCONTINUED | OUTPATIENT
Start: 2022-01-21 | End: 2022-01-21

## 2022-01-21 RX ORDER — HYDROMORPHONE HCL IN WATER/PF 6 MG/30 ML
0.2 PATIENT CONTROLLED ANALGESIA SYRINGE INTRAVENOUS EVERY 5 MIN PRN
Status: DISCONTINUED | OUTPATIENT
Start: 2022-01-21 | End: 2022-01-22 | Stop reason: HOSPADM

## 2022-01-21 RX ORDER — IBUPROFEN 400 MG/1
800 TABLET, FILM COATED ORAL EVERY 6 HOURS PRN
COMMUNITY
End: 2022-02-14

## 2022-01-21 RX ORDER — FENTANYL CITRATE 50 UG/ML
INJECTION, SOLUTION INTRAMUSCULAR; INTRAVENOUS PRN
Status: DISCONTINUED | OUTPATIENT
Start: 2022-01-21 | End: 2022-01-21

## 2022-01-21 RX ORDER — ONDANSETRON 4 MG/1
4 TABLET, ORALLY DISINTEGRATING ORAL EVERY 30 MIN PRN
Status: DISCONTINUED | OUTPATIENT
Start: 2022-01-21 | End: 2022-01-22 | Stop reason: HOSPADM

## 2022-01-21 RX ORDER — PROPOFOL 10 MG/ML
INJECTION, EMULSION INTRAVENOUS PRN
Status: DISCONTINUED | OUTPATIENT
Start: 2022-01-21 | End: 2022-01-21

## 2022-01-21 RX ORDER — ONDANSETRON 2 MG/ML
4 INJECTION INTRAMUSCULAR; INTRAVENOUS EVERY 30 MIN PRN
Status: DISCONTINUED | OUTPATIENT
Start: 2022-01-21 | End: 2022-01-22 | Stop reason: HOSPADM

## 2022-01-21 RX ORDER — KETOROLAC TROMETHAMINE 15 MG/ML
15 INJECTION, SOLUTION INTRAMUSCULAR; INTRAVENOUS
Status: DISCONTINUED | OUTPATIENT
Start: 2022-01-21 | End: 2022-01-22 | Stop reason: HOSPADM

## 2022-01-21 RX ORDER — GLYCOPYRROLATE 0.2 MG/ML
INJECTION, SOLUTION INTRAMUSCULAR; INTRAVENOUS PRN
Status: DISCONTINUED | OUTPATIENT
Start: 2022-01-21 | End: 2022-01-21

## 2022-01-21 RX ORDER — EPHEDRINE SULFATE 50 MG/ML
INJECTION, SOLUTION INTRAMUSCULAR; INTRAVENOUS; SUBCUTANEOUS PRN
Status: DISCONTINUED | OUTPATIENT
Start: 2022-01-21 | End: 2022-01-21

## 2022-01-21 RX ORDER — DEXAMETHASONE SODIUM PHOSPHATE 4 MG/ML
INJECTION, SOLUTION INTRA-ARTICULAR; INTRALESIONAL; INTRAMUSCULAR; INTRAVENOUS; SOFT TISSUE PRN
Status: DISCONTINUED | OUTPATIENT
Start: 2022-01-21 | End: 2022-01-21

## 2022-01-21 RX ORDER — CEFAZOLIN SODIUM 2 G/100ML
2 INJECTION, SOLUTION INTRAVENOUS
Status: COMPLETED | OUTPATIENT
Start: 2022-01-21 | End: 2022-01-21

## 2022-01-21 RX ORDER — ACETAMINOPHEN 500 MG
1000 TABLET ORAL
Status: COMPLETED | OUTPATIENT
Start: 2022-01-21 | End: 2022-01-21

## 2022-01-21 RX ORDER — ONDANSETRON 2 MG/ML
INJECTION INTRAMUSCULAR; INTRAVENOUS PRN
Status: DISCONTINUED | OUTPATIENT
Start: 2022-01-21 | End: 2022-01-21

## 2022-01-21 RX ORDER — GABAPENTIN 300 MG/1
300 CAPSULE ORAL
Status: COMPLETED | OUTPATIENT
Start: 2022-01-21 | End: 2022-01-21

## 2022-01-21 RX ORDER — OXYCODONE HYDROCHLORIDE 5 MG/1
5 TABLET ORAL EVERY 4 HOURS PRN
Status: DISCONTINUED | OUTPATIENT
Start: 2022-01-21 | End: 2022-01-22 | Stop reason: HOSPADM

## 2022-01-21 RX ADMIN — PROPOFOL 200 MG: 10 INJECTION, EMULSION INTRAVENOUS at 11:50

## 2022-01-21 RX ADMIN — PROPOFOL 180 MCG/KG/MIN: 10 INJECTION, EMULSION INTRAVENOUS at 11:50

## 2022-01-21 RX ADMIN — GABAPENTIN 300 MG: 300 CAPSULE ORAL at 11:26

## 2022-01-21 RX ADMIN — GLYCOPYRROLATE 0.2 MG: 0.2 INJECTION, SOLUTION INTRAMUSCULAR; INTRAVENOUS at 11:50

## 2022-01-21 RX ADMIN — KETOROLAC TROMETHAMINE 15 MG: 15 INJECTION, SOLUTION INTRAMUSCULAR; INTRAVENOUS at 11:28

## 2022-01-21 RX ADMIN — EPHEDRINE SULFATE 10 MG: 50 INJECTION, SOLUTION INTRAMUSCULAR; INTRAVENOUS; SUBCUTANEOUS at 11:57

## 2022-01-21 RX ADMIN — CEFAZOLIN SODIUM 2 G: 2 INJECTION, SOLUTION INTRAVENOUS at 11:45

## 2022-01-21 RX ADMIN — SODIUM CHLORIDE, SODIUM LACTATE, POTASSIUM CHLORIDE, CALCIUM CHLORIDE: 600; 310; 30; 20 INJECTION, SOLUTION INTRAVENOUS at 11:26

## 2022-01-21 RX ADMIN — FENTANYL CITRATE 100 MCG: 50 INJECTION, SOLUTION INTRAMUSCULAR; INTRAVENOUS at 11:50

## 2022-01-21 RX ADMIN — DEXAMETHASONE SODIUM PHOSPHATE 10 MG: 4 INJECTION, SOLUTION INTRA-ARTICULAR; INTRALESIONAL; INTRAMUSCULAR; INTRAVENOUS; SOFT TISSUE at 11:50

## 2022-01-21 RX ADMIN — Medication 1000 MG: at 11:26

## 2022-01-21 RX ADMIN — Medication 100 MCG: at 11:55

## 2022-01-21 RX ADMIN — ONDANSETRON 4 MG: 2 INJECTION INTRAMUSCULAR; INTRAVENOUS at 11:50

## 2022-01-21 RX ADMIN — LIDOCAINE HYDROCHLORIDE 50 MG: 20 INJECTION, SOLUTION INFILTRATION; PERINEURAL at 11:50

## 2022-01-21 ASSESSMENT — MIFFLIN-ST. JEOR: SCORE: 1601.59

## 2022-01-21 NOTE — ANESTHESIA PREPROCEDURE EVALUATION
Anesthesia Pre-Procedure Evaluation    Patient: Tej Sanders   MRN: [id number] : 1955        Preoperative Diagnosis: Calculus of ureter [N20.1]  Hydronephrosis with urinary obstruction due to ureteral calculus [N13.2]    Procedure : Procedure(s):  CYSTOURETEROSCOPY, RETROGRADE PYELOGRAM, LASER LITHOTRIPSY WITH CALCULUS REMOVAL AND URETERAL STENT INSERTION          Past Medical History:   Diagnosis Date     Kidney stone     x2 spontaneous passage      History reviewed. No pertinent surgical history.   No Known Allergies   Social History     Tobacco Use     Smoking status: Never Smoker     Smokeless tobacco: Never Used   Substance Use Topics     Alcohol use: Not Currently      Wt Readings from Last 1 Encounters:   22 83.9 kg (185 lb)        Anesthesia Evaluation            ROS/MED HX  ENT/Pulmonary:  - neg pulmonary ROS     Neurologic:  - neg neurologic ROS   (+) migraines,     Cardiovascular:  - neg cardiovascular ROS     METS/Exercise Tolerance:     Hematologic:  - neg hematologic  ROS     Musculoskeletal: Comment: Low back pain - neg musculoskeletal ROS     GI/Hepatic:  - neg GI/hepatic ROS     Renal/Genitourinary:  - neg Renal ROS   (+) Nephrolithiasis ,     Endo:  - neg endo ROS     Psychiatric/Substance Use:  - neg psychiatric ROS     Infectious Disease:  - neg infectious disease ROS     Malignancy:  - neg malignancy ROS     Other:  - neg other ROS          Physical Exam    Airway        Mallampati: II   TM distance: > 3 FB   Neck ROM: full   Mouth opening: > 3 cm    Respiratory Devices and Support         Dental  no notable dental history     (+) caps      Cardiovascular   cardiovascular exam normal          Pulmonary   pulmonary exam normal                OUTSIDE LABS:  CBC:   Lab Results   Component Value Date    WBC 11.2 (H) 2022    HGB 14.8 2022    HCT 44.7 2022     2022     BMP:   Lab Results   Component Value Date     2022    POTASSIUM 4.6  01/19/2022    CHLORIDE 107 01/19/2022    CO2 23 01/19/2022    BUN 18 01/19/2022    CR 1.43 (H) 01/19/2022     (H) 01/19/2022     COAGS: No results found for: PTT, INR, FIBR  POC: No results found for: BGM, HCG, HCGS  HEPATIC:   Lab Results   Component Value Date    ALBUMIN 4.2 01/19/2022    PROTTOTAL 7.6 01/19/2022    ALT 24 01/19/2022    AST 22 01/19/2022    ALKPHOS 89 01/19/2022    BILITOTAL 0.3 01/19/2022     OTHER:   Lab Results   Component Value Date    CORI 9.6 01/19/2022    CRP 0.3 01/19/2022       Anesthesia Plan    ASA Status:  2   NPO Status:  NPO Appropriate    Anesthesia Type: General.     - Airway: LMA   Induction: Propofol.   Maintenance: TIVA.        Consents    Anesthesia Plan(s) and associated risks, benefits, and realistic alternatives discussed. Questions answered and patient/representative(s) expressed understanding.    - Discussed:     - Discussed with:  Patient         Postoperative Care    Pain management: Multi-modal analgesia.   PONV prophylaxis: Ondansetron (or other 5HT-3), Dexamethasone or Solumedrol     Comments:    Other Comments: Reviewed anesthetic options and risks, including risk of dental trauma. Patient agrees to proceed.                    -Asymptomatic COVID-19 Virus (Coronavirus) by PCR Nasopharyngeal:   Collection Time: 01/20/22  1:47 AM  Specimen: Nasopharyngeal; Swab       Result                                            Value                         Ref Range                       SARS CoV2 PCR                                     Negative                      Negative                             Sunny Bella MD

## 2022-01-21 NOTE — ANESTHESIA CARE TRANSFER NOTE
Patient: Tej Sanders    Procedure: Procedure(s):  CYSTOURETEROSCOPY, RETROGRADE PYELOGRAM, LASER LITHOTRIPSY WITH CALCULUS REMOVAL AND URETERAL STENT INSERTION       Diagnosis: Calculus of ureter [N20.1]  Hydronephrosis with urinary obstruction due to ureteral calculus [N13.2]  Diagnosis Additional Information: No value filed.    Anesthesia Type:   General     Note:    Oropharynx: oropharynx clear of all foreign objects  Level of Consciousness: drowsy  Oxygen Supplementation: face mask  Level of Supplemental Oxygen (L/min / FiO2): 8  Independent Airway: airway patency satisfactory and stable  Dentition: dentition unchanged  Vital Signs Stable: post-procedure vital signs reviewed and stable  Report to RN Given: handoff report given  Patient transferred to: PACU    Handoff Report: Identifed the Patient, Identified the Reponsible Provider, Reviewed the pertinent medical history, Discussed the surgical course, Reviewed Intra-OP anesthesia mangement and issues during anesthesia, Set expectations for post-procedure period and Allowed opportunity for questions and acknowledgement of understanding      Vitals:  Vitals Value Taken Time   /64    Temp 97.8    Pulse 72    Resp 14    SpO2 97        Electronically Signed By: NELSON Deleon CRNA  January 21, 2022  12:31 PM

## 2022-01-21 NOTE — OP NOTE
[facility]  Kidney Stone [place] Operative Note    Tej Sanders   January 21, 2022 1/21/2022   Comfort Willard     Procedure Performed  Procedure(s):  CYSTOURETEROSCOPY, RETROGRADE PYELOGRAM, LASER LITHOTRIPSY WITH CALCULUS REMOVAL AND URETERAL STENT INSERTION  1. Cystoscopy  2. Retrograde Pyelography - Right  3. Ureteroscopic Laser Lithotripsy - Right Ureter Proximal  4. Ureteral Stent Insertion - Right      Pre-operative Diagnosis  Calculus of ureter [N20.1]  Hydronephrosis with urinary obstruction due to ureteral calculus [N13.2]    Post-operative Diagnosis  1. Stone Right Ureter Proximal      Surgeon(s) and Role:     * Arnold Avila MD - Primary    Anesthesia Type  Not documented     Procedural Summary    Estimation of stone clearance: <2 mm residual  Subjective stone composition: calcium  Renal papillae assessment: plaques moderate  Unanticipated event/findings: none  Post-operative plan: remove own stent in 6 days with extraction string return to clinic in 1 month without CT scan    Narrative    Successful clearance of large impacted right proximal ureteral stone. Several calcifications in the kidney on CT were not evident in the collecting system on ureteroscopy.    Procedural Details    Patient is brought to the surgical suite where anesthesia is induced. he is prepped and draped in standard fashion in combined Trendelenberg and lithotomy position.    Cystoscopy: Rigid cystoscopy is performed. Anterior and posterior urethra are normal. Prostate demonstrates moderate adenopathy. Bladder is normal..     Retrograde Pyelography:  imaging demonstrates radio-opaque proximal ureteric stone consistent with pre-operative imaging. Right retrograde pyelography demonstrates radio-opaque proximal ureteric stone with obstruction.     Ureteral Access: Right ureteral access is initiated with Sensor wire. Guide wire access to the kidney is assured. 8F dilator is inserted with minimal resistance. 10F  dilator is inserted with minimal resistance. 11F ureteral access sheath obturator is inserted with minimal resistance. 13F 36 cm ureteral access sheath is inserted with minimal resistance.      Flexible Ureteroscopy: Flexible ureteroscope is passed to right proximal ureteric stone.   Stone is moderately impacted. Stone retropulses to kidney. Stone is translocated to dependent renal upper pole calyx. Laser lithotripsy is performed in the kidney. All stone fragments are removed from kidney. Multiple peripheral calcifications were observed in the kidney on CT but these were not evident within the collecting system.     Ureteral Stent Insertion: Right 7F 26 cm stent is inserted with good coil in kidney and bladder under fluoroscopic guidance. Stent extraction string left dangling from urethra.      The patient was then taken to the recovery room in good condition.     Past Medical History:   Diagnosis Date     Kidney stone     x2 spontaneous passage        Patient Active Problem List   Diagnosis     Migraine     Kidney stones     Calculus of ureter     Hydronephrosis with urinary obstruction due to ureteral calculus        Estimated Blood Loss  .0 mL     ID Type Source Tests Collected by Time Destination   A :  Calculus/Stone Ureter, Right STONE ANALYSIS Arnold Avila MD 1/21/2022 12:10 PM

## 2022-01-21 NOTE — ANESTHESIA POSTPROCEDURE EVALUATION
Patient: Tej Sanders    Procedure: Procedure(s):  CYSTOURETEROSCOPY, RETROGRADE PYELOGRAM, LASER LITHOTRIPSY WITH CALCULUS REMOVAL AND URETERAL STENT INSERTION       Diagnosis:Calculus of ureter [N20.1]  Hydronephrosis with urinary obstruction due to ureteral calculus [N13.2]  Diagnosis Additional Information: No value filed.    Anesthesia Type:  General    Note:  Disposition: Outpatient   Postop Pain Control: Uneventful            Sign Out: Well controlled pain   PONV: No   Neuro/Psych: Uneventful            Sign Out: Acceptable/Baseline neuro status   Airway/Respiratory: Uneventful            Sign Out: Acceptable/Baseline resp. status   CV/Hemodynamics: Uneventful            Sign Out: Acceptable CV status; No obvious hypovolemia; No obvious fluid overload   Other NRE: NONE   DID A NON-ROUTINE EVENT OCCUR? No           Last vitals:  Vitals Value Taken Time   /61 01/21/22 1245   Temp 97.5  F (36.4  C) 01/21/22 1245   Pulse 63 01/21/22 1245   Resp 16 01/21/22 1245   SpO2 93 % 01/21/22 1245       Electronically Signed By: Sunny Bella MD  January 21, 2022  2:15 PM

## 2022-01-21 NOTE — DISCHARGE INSTRUCTIONS
Take tylenol and ibuprofen every 6 hours as your main form of pain control.     Acetaminophen (Tylenol): Next Dose: 5:28 pm. Take 650-1000 mg every 6 hours for mild to moderate pain.    Do not exceed 4,000 mg of acetaminophen during a 24 hour period and keep in mind that acetaminophen can also be found in many over-the-counter cold medications as well as narcotics that may be given for pain.     Ibuprofen (Motrin, Advil): Next Dose: 5:28 pm. Take 600 mg every 6 hours for mild to moderate pain.    Senna-Docusate (Stool Softener): Next dose: When you get home. Take as instructed on the bottle. This is an over-the-counter medication. Take this while taking narcotic medications to prevent constipation.      Going Home With a Ureteral Stent    What is it?  A stent is a soft, plastic tube that helps urine (pee) drain into the bladder.  During the surgery, it is placed in the ureter the tube that connects the kidney to the bladder.  A thin curl at each end of the stent keeps one end in your kidney and the other in your bladder.  The stent can not be seen from outside of the body.    Why Do I Have It?  Some sort of blockage is not letting pee drain into your bladder.  This could be from a stone, certain surgeries or kidney infection.    What Should I Expect?   Stents often cause some discomfort. You may have:    The need to pee suddenly    Pain when you pee    A dull backache, which may get worse when you pee  Blood in your pee (color of fruit punch) and some clots, which may increase with physical activity.     What Can I Do To Feel Better?    Drink a little more fluids than usual.  You can eat your normal diet.    Enjoy a warm bath.  Decrease your activity.  Some people find bending or twisting movement cause discomfort or increased blood in the urine.  Even so, you will not harm yourself.  Kidney Stone [place]    Stent Removal With String    -Take Tylenol or Ibuprofen and drink fluids 1 hour prior to removing your  "stent at home.    -Remove the stent in the morning on January 27th.  Gently pull on the string in the shower while urinating until the stent is completely removed.    -Call [facility] if you have questions or concerns at [phone].    What To Expect After Your Stent Is Removed:    -After stent removal, urine may be bloody and possibly have some bloody clots.    -You may experience an \"achy\" pain due to urethral spasms.  This generally only lasts a few hours, but should resolve over the next 2-3 days     -Take Tylenol or Ibuprofen and drink fluids 1 hour prior to removing your stent at home.     Discharge Instructions: After Your Surgery    You ve just had surgery. During surgery, you were given medicine called anesthesia to keep you relaxed and free of pain. After surgery, you may have some pain or nausea. This is common. Here are some tips for feeling better and getting well after surgery.    Going home    Your healthcare provider will show you how to take care of yourself when you go home. He or she will also answer your questions. Have an adult family member or friend drive you home. For the first 24 hours after your surgery:    Don't drive or use heavy equipment.    Don't make important decisions or sign legal papers.    Don't drink alcohol.    Have an adult stay with you for 24 hours. He or she can watch for problems and help keep you safe.  Be sure to go to all follow-up visits with your healthcare provider. And rest after your surgery for as long as your healthcare provider tells you to.    Coping with pain    If you have pain after surgery, pain medicine will help you feel better. Take it as told, before pain becomes severe. Also, ask your healthcare provider or pharmacist about other ways to control pain. This might be with heat, ice, or relaxation. And follow any other instructions your surgeon or nurse gives you.    Tips for taking pain medicine    To get the best relief possible, remember these " points:    Pain medicines can upset your stomach. Taking them with a little food may help.    Most pain relievers taken by mouth need at least 20 to 30 minutes to start to work.    Don't wait till your pain becomes severe before you take your medicine. Try to time your medicine so that you can take it before starting an activity. This might be before you get dressed, go for a walk, or sit down for dinner.    Constipation is a common side effect of pain medicines. It's ok to take medicines such as laxatives or stool softeners to help ease constipation. Also ask if you should skip any foods. Drinking lots of fluids and eating foods such as fruits and vegetables that are high in fiber can also help.    Drinking alcohol and taking pain medicine can cause dizziness and slow your breathing. It can even be deadly. Don't drink alcohol while taking pain medicine.    Pain medicine can make you react more slowly to things. Don't drive or run machinery while taking pain medicine.  Your healthcare provider may tell you to take acetaminophen to help ease your pain. Ask him or her how much you are supposed to take each day. Acetaminophen or other pain relievers may interact with your prescription medicines or other over-the-counter (OTC) medicines. Some prescription medicines have acetaminophen and other ingredients. Using both prescription and OTC acetaminophen for pain can cause you to overdose. Read the labels on your OTC medicines with care. This will help you to clearly know the list of ingredients, how much to take, and any warnings. It may also help you not take too much acetaminophen. If you have questions or don't understand the information, ask your pharmacist or healthcare provider to explain it to you before you take the OTC medicine.    Managing nausea    Some people have an upset stomach after surgery. This is often because of anesthesia, pain, or pain medicine, or the stress of surgery. These tips will help you  handle nausea and eat healthy foods as you get better. If you were on a special food plan before surgery, ask your healthcare provider if you should follow it while you get better. These tips may help:      Don't push yourself to eat. Your body will tell you when to eat and how much.    Start off with clear liquids and soup. They are easier to digest.    Next try semi-solid foods, such as mashed potatoes, applesauce, and gelatin, as you feel ready.    Slowly move to solid foods. Don t eat fatty, rich, or spicy foods at first.    Don't force yourself to have 3 large meals a day. Instead eat smaller amounts more often.    Take pain medicines with a small amount of solid food, such as crackers or toast, to prevent nausea.    When to call your healthcare provider    Call your healthcare provider if:    You still have intolerable pain an hour after taking medicine. The medicine may not be strong enough.    You feel too sleepy, dizzy, or groggy. The medicine may be too strong.    You have side effects such as nausea or vomiting, or skin changes such as rash, itching, or hives. Your healthcare provider may suggest other medicines to control side effects.  Rash, itching, or hives may mean you have an allergic reaction. Report this right away. If you have trouble breathing or facial swelling, call 911 right away.

## 2022-01-25 LAB
APPEARANCE STONE: NORMAL
COMPN STONE: NORMAL
SPECIMEN WT: 24 MG

## 2022-01-27 ENCOUNTER — TELEPHONE (OUTPATIENT)
Dept: UROLOGY | Facility: CLINIC | Age: 67
End: 2022-01-27
Payer: COMMERCIAL

## 2022-01-27 NOTE — CONFIDENTIAL NOTE
Message left for patient to follow up with him post stent removal at home.  Winsome Méndez RN    Patient returned call and states that he was able to remove his stent without issue.  He is feeling well and will call as needed.  He is set up for his one month post op.  Winsome Méndez RN

## 2022-02-14 ENCOUNTER — VIRTUAL VISIT (OUTPATIENT)
Dept: UROLOGY | Facility: CLINIC | Age: 67
End: 2022-02-14
Payer: COMMERCIAL

## 2022-02-14 ENCOUNTER — TELEPHONE (OUTPATIENT)
Dept: UROLOGY | Facility: CLINIC | Age: 67
End: 2022-02-14

## 2022-02-14 DIAGNOSIS — N20.0 CALCULUS OF KIDNEY: Primary | ICD-10-CM

## 2022-02-14 PROCEDURE — 99213 OFFICE O/P EST LOW 20 MIN: CPT | Mod: GT | Performed by: UROLOGY

## 2022-02-14 ASSESSMENT — PAIN SCALES - GENERAL: PAINLEVEL: NO PAIN (0)

## 2022-02-14 NOTE — PROGRESS NOTES
Patient is roomed via telephone for a virtual visit.  Patient confirmed he is in the [facility] at the time of this appointment.  Patient understands that this virtual visit is billable and agree to proceed with appointment.

## 2022-02-14 NOTE — PATIENT INSTRUCTIONS
Patient Stated Goal: Prevent further stones  Steps for collecting a 24 hour urine specimen    Please follow the directions carefully. All urine voided for a 24-hour period needs to be collected into the jug.  DO NOT change any of your  normal  daily habits when doing this test. Continue to follow your regular diet, intake of fluids, and usual activity level. Pick the most convenient day with your schedule, perhaps on a weekend or a day off.    Start your Diet Log the day before collection and continue on the day of urine collection.  You MUST bring Diet Log with you on follow up visit to discuss results.    One 24hr Urine Collection     Two 24hr Urine Collections  (do not collect on consecutive days)    PLEASE COMPLETE THE 2nd JUG WITHIN 1-2 WEEKS FROM THE 1st JUG    STEP 1  Empty your bladder completely into the toilet. This will be your start time. Write your full legal name, start date and time on the jug label.  Collection start and stop times need to match exactly!  For example:  6 am to 6 am.    STEP 2  The next time you urinate, empty your bladder directly into the jug or collection hat and pour urine into the jug.  Screw the lid back onto the jug.  Do not spill!    STEP 3  Place the jug in the refrigerator or a cooler with ice during the collection period.  Failure to keep it cool could cause inaccurate test results. DO NOT Freeze.    STEP 4  Continue collecting all urine into the jug for the rest of the day, for the full 24 hours.  DO NOT stop early or go over 24 hours!    STEP 5  Exactly 24 hours from start of collection, write your full legal name, stop date and time on the jug label.   Collection start and stop times need to match exactly!  For example:  6 am to 6 am.  Failure to label correctly will result in recollection of urine specimen.    STEP 6  Return each jug within 24 hours after final urination.     STEP 7  Drop off jug locations:       STEP 8  Please call [facility] after return of your final jug  to schedule your follow-up visit. [phone]

## 2022-02-14 NOTE — PROGRESS NOTES
Assessment/Plan:    Assessment & Plan   Tej was seen today for post-op visit.    Diagnoses and all orders for this visit:    Calculus of kidney  -     24 HR URINE MAGNESIUM; Future  -     Renal Function Panel ([facility]); Future  -     Calcium  Ionized  Serum ([facility]); Future  -     Parathyroid Hormone Intact; Future  -     Patient Stated Goal: Prevent further stones  -     CT Abdomen Pelvis w/o Contrast; Future        Stone Management Plan  Stone Management 1/20/2022 2/14/2022   Urinary Tract Infection No suspicion of infection No suspicion of infection   Renal Colic Well controlled symptoms Asymptomatic at this time   Renal Failure No suspicion of renal failure No suspicion of renal failure   Current CT date 1/20/2022 -   Right sided stones? Yes -   R Number of ureteral stones 1 -   R GSD of ureteral stones 9 -   R Location of ureteral stone Proximal -   R Number of kidney stones  7 -   R GSD of kidney stones 4 - 10 -   R Hydronephrosis Mild -   R Stone Event New event Resolved event   Diagnosis date 1/20/2022 -   Initial location of primary symptomatic stone Proximal -   Initial GSD of primary symptomatic stone 9 -   Resolved date - 2/14/2022   R Post-op status - No imaging   R Current Plan Clear -   Clear rationale Symptomatic -   Left sided stones? Yes -   L Number of ureteral stones No ureteral stones -   L Number of kidney stones  4 -   L GSD of kidney stones 4 - 10 -   L Hydronephrosis None -   L Stone Event No current event No current event   L Current Plan Observe -   Observe rationale Significant stone burden amenable to emergency management -             PLAN    Video call duration: 10 minutes  15 minutes spent on the date of the encounter doing chart review, history and exam, documentation and further activities per the note    ETHEL DAVENPORT MD  [facility]    HPI  Mr. Tej Sanders is a 67 year old  male who is being evaluated via a billable video  visit by [facility] [place] for late postoperative follow-up.     He returns status post Right ureteroscopic laser lithotripsy for proximal ureteral stone. He has had no unanticipated post-operative events.     He is asymptomatic at present. He denies symptoms of fever, chills, flank pain, nausea, vomiting, urinary frequency and dysuria.    Unusual case where the ureteral stone was confidently cleared but numerous other renal stones seen on pre-op CT were not evident. Possibly the additional stones were submucosal or associated with some sort of atypical collecting system duplication.     Stone composition was 90 % calcium oxalate and 10 % calcium phosphate (apatite).     He is at risk for ongoing active stone disease and will initiate stone risk evaluation. Serum stone risk chemistries including parathyroid hormone and ionized calcium will be obtained before next visit. Two 24 hour urine collections and dietary journal will be obtained in about a month after some scheduled travel..    Will also place an order for a follow up CT in 6 months.    ROS   Review of systems is negative except for HPI.    Past Medical History:   Diagnosis Date     Kidney stone     x2 spontaneous passage       Past Surgical History:   Procedure Laterality Date     COMBINED CYSTOSCOPY, INSERT STENT URETER(S) Right 1/21/2022    Procedure: CYSTOURETEROSCOPY, RETROGRADE PYELOGRAM, LASER LITHOTRIPSY WITH CALCULUS REMOVAL AND URETERAL STENT INSERTION;  Surgeon: Arnold Avila MD;  Location: [facility] OR       No current outpatient medications on file.       No Known Allergies    Social History     Socioeconomic History     Marital status:      Spouse name: Not on file     Number of children: Not on file     Years of education: Not on file     Highest education level: Not on file   Occupational History     Not on file   Tobacco Use     Smoking status: Never Smoker     Smokeless tobacco: Never Used   Substance  and Sexual Activity     Alcohol use: Not Currently     Drug use: Never     Sexual activity: Not on file   Other Topics Concern     Parent/sibling w/ CABG, MI or angioplasty before 65F 55M? Not Asked   Social History Narrative     Not on file     Social Determinants of Health     Financial Resource Strain: Not on file   Food Insecurity: Not on file   Transportation Needs: Not on file   Physical Activity: Not on file   Stress: Not on file   Social Connections: Not on file   Intimate Partner Violence: Not on file   Housing Stability: Not on file       No family history on file.    Objective:     Appears AAO x 3  No vitals obtained due to virtual visit    Labs   Most Recent 3 CBC's:Recent Labs   Lab Test 01/19/22  2314   WBC 11.2*   HGB 14.8   MCV 88        Most Recent 3 BMP's:Recent Labs   Lab Test 01/19/22  2314      POTASSIUM 4.6   CHLORIDE 107   CO2 23   BUN 18   CR 1.43*   ANIONGAP 11   CORI 9.6   *     Most Recent Urinalysis:Recent Labs   Lab Test 01/19/22  2308   COLOR Light Yellow   APPEARANCE Clear   URINEGLC Negative   URINEBILI Negative   URINEKETONE Negative   SG 1.027   UBLD Negative   URINEPH 5.5   PROTEIN 10 *   NITRITE Negative   LEUKEST 25 Cecilia/uL*   RBCU 7*   WBCU 8*     Acute Labs Urine Culture  No results found for: CULTURE